# Patient Record
Sex: FEMALE | Race: OTHER | Employment: FULL TIME | URBAN - METROPOLITAN AREA
[De-identification: names, ages, dates, MRNs, and addresses within clinical notes are randomized per-mention and may not be internally consistent; named-entity substitution may affect disease eponyms.]

---

## 2020-10-23 ENCOUNTER — HOSPITAL ENCOUNTER (EMERGENCY)
Age: 25
Discharge: HOME | End: 2020-10-23
Attending: EMERGENCY MEDICINE

## 2020-10-23 VITALS
DIASTOLIC BLOOD PRESSURE: 71 MMHG | RESPIRATION RATE: 16 BRPM | HEIGHT: 60 IN | SYSTOLIC BLOOD PRESSURE: 127 MMHG | BODY MASS INDEX: 25.97 KG/M2 | WEIGHT: 132.28 LBS | OXYGEN SATURATION: 99 % | TEMPERATURE: 98 F | HEART RATE: 79 BPM

## 2020-10-23 DIAGNOSIS — Z20.822 EXPOSURE TO COVID-19 VIRUS: Primary | ICD-10-CM

## 2020-10-23 DIAGNOSIS — Z20.822 PERSON UNDER INVESTIGATION FOR COVID-19: ICD-10-CM

## 2020-10-23 DIAGNOSIS — N39.0 URINARY TRACT INFECTION WITHOUT HEMATURIA, SITE UNSPECIFIED: ICD-10-CM

## 2020-10-23 LAB
APPEARANCE UR: CLEAR
BACTERIA URNS QL MICRO: ABNORMAL /HPF
BILIRUB UR QL: NEGATIVE
COLOR UR: YELLOW
EPITH CASTS URNS QL MICRO: ABNORMAL /LPF (ref 0–5)
GLUCOSE UR STRIP.AUTO-MCNC: NEGATIVE MG/DL
HGB UR QL STRIP: ABNORMAL
KETONES UR QL STRIP.AUTO: NEGATIVE MG/DL
LEUKOCYTE ESTERASE UR QL STRIP.AUTO: ABNORMAL
NITRITE UR QL STRIP.AUTO: NEGATIVE
PH UR STRIP: 6.5 [PH] (ref 5–8)
PROT UR STRIP-MCNC: NEGATIVE MG/DL
RBC #/AREA URNS HPF: ABNORMAL /HPF (ref 0–5)
SP GR UR REFRACTOMETRY: <1.005 (ref 1–1.03)
UROBILINOGEN UR QL STRIP.AUTO: 0.2 EU/DL (ref 0.2–1)
WBC URNS QL MICRO: ABNORMAL /HPF (ref 0–5)

## 2020-10-23 PROCEDURE — 99283 EMERGENCY DEPT VISIT LOW MDM: CPT

## 2020-10-23 PROCEDURE — 81001 URINALYSIS AUTO W/SCOPE: CPT

## 2020-10-23 PROCEDURE — 87635 SARS-COV-2 COVID-19 AMP PRB: CPT

## 2020-10-23 RX ORDER — ONDANSETRON 4 MG/1
4 TABLET, ORALLY DISINTEGRATING ORAL
Qty: 20 TAB | Refills: 0 | Status: SHIPPED | OUTPATIENT
Start: 2020-10-23

## 2020-10-23 RX ORDER — AMOXICILLIN AND CLAVULANATE POTASSIUM 500; 125 MG/1; MG/1
1 TABLET, FILM COATED ORAL 2 TIMES DAILY
Qty: 20 TAB | Refills: 0 | Status: SHIPPED | OUTPATIENT
Start: 2020-10-23

## 2020-10-23 NOTE — ED PROVIDER NOTES
EMERGENCY DEPARTMENT HISTORY AND PHYSICAL EXAM    Date: 10/23/2020  Patient Name: Minh Fisher    History of Presenting Illness     Chief Complaint   Patient presents with    Fever    Generalized Body Aches         History Provided By: Patient and Pashto interprter    Minh Fisher is a 30 y.o. female with no PMHX who presents to the emergency department C/O cough. Associated sxs include congestion body aches subjective fevers. Reports cough congestion body aches subjective fevers acute onset 2 days ago. Patient states that she has had a positive exposure while at work from a coworker. Patient also states that her  has similar symptoms and has not been tested. Pt denies pain, shortness of breath, and any other sxs or complaints. PCP: No primary care provider on file. Past History     Past Medical History:  Past Medical History:   Diagnosis Date    Rhinitis        Past Surgical History:  History reviewed. No pertinent surgical history. Family History:  History reviewed. No pertinent family history. Social History:  Social History     Tobacco Use    Smoking status: Never Smoker    Smokeless tobacco: Never Used   Substance Use Topics    Alcohol use: Not Currently    Drug use: Never       Allergies:  No Known Allergies      Review of Systems   Review of Systems   Constitutional: Positive for chills and fever. HENT: Positive for congestion. Respiratory: Positive for cough. Negative for shortness of breath. Cardiovascular: Negative for chest pain. Musculoskeletal: Positive for myalgias. All other systems reviewed and are negative. Physical Exam     Vitals:    10/23/20 1513   BP: 127/71   Pulse: 79   Resp: 16   Temp: 98 °F (36.7 °C)   SpO2: 99%   Weight: 60 kg (132 lb 4.4 oz)   Height: 5' 0.24\" (1.53 m)     Physical Exam  Vitals signs and nursing note reviewed. Constitutional:       General: She is not in acute distress. Appearance: Normal appearance. She is normal weight. She is not ill-appearing. HENT:      Head: Normocephalic and atraumatic. Nose: Congestion present. Eyes:      Extraocular Movements: Extraocular movements intact. Conjunctiva/sclera: Conjunctivae normal.      Pupils: Pupils are equal, round, and reactive to light. Neck:      Musculoskeletal: Normal range of motion and neck supple. Cardiovascular:      Rate and Rhythm: Normal rate and regular rhythm. Pulmonary:      Effort: Pulmonary effort is normal.      Breath sounds: Normal breath sounds. Musculoskeletal: Normal range of motion. Skin:     General: Skin is warm and dry. Neurological:      General: No focal deficit present. Mental Status: She is alert and oriented to person, place, and time. Psychiatric:         Mood and Affect: Mood normal.         Behavior: Behavior normal.         Diagnostic Study Results     Labs -     Recent Results (from the past 12 hour(s))   URINALYSIS W/ RFLX MICROSCOPIC    Collection Time: 10/23/20  3:35 PM   Result Value Ref Range    Color YELLOW      Appearance CLEAR      Specific gravity <1.005 (L) 1.005 - 1.030    pH (UA) 6.5 5.0 - 8.0      Protein Negative NEG mg/dL    Glucose Negative NEG mg/dL    Ketone Negative NEG mg/dL    Bilirubin Negative NEG      Blood MODERATE (A) NEG      Urobilinogen 0.2 0.2 - 1.0 EU/dL    Nitrites Negative NEG      Leukocyte Esterase MODERATE (A) NEG     URINE MICROSCOPIC ONLY    Collection Time: 10/23/20  3:35 PM   Result Value Ref Range    WBC 5 to 10 0 - 5 /hpf    RBC 0 to 3 0 - 5 /hpf    Epithelial cells 2+ 0 - 5 /lpf    Bacteria FEW (A) NEG /hpf       Radiologic Studies -   No orders to display     CT Results  (Last 48 hours)    None        CXR Results  (Last 48 hours)    None          Medications given in the ED-  Medications - No data to display      Medical Decision Making   I am the first provider for this patient.     I reviewed the vital signs, available nursing notes, past medical history, past surgical history, family history and social history. Vital Signs-Reviewed the patient's vital signs. Pulse Oximetry Analysis - 99% on RA     Records Reviewed: Nursing Notes    Procedures:  Procedures    ED Course:   4:18 PM   Initial assessment performed. The patients presenting problems have been discussed, and they are in agreement with the care plan formulated and outlined with them. I have encouraged them to ask questions as they arise throughout their visit. The COVID19 pandemic has been declared a public health emergency and has led to the need to minimize testing due to significant resource scarcity. I have explained to the patient the importance of home isolation and strict return precautions. Patient has been counseled about the need for self quarantine especially if feeling ill &/or if fever is present. Discussion: 30 y.o. female who is otherwise healthy primarily Omani-speaking with that 2 days of URI type symptoms subjective fevers. Reported positive exposure to Covid while at work requesting screening. Patient is afebrile she has appropriate vital signs with some mild congestion but otherwise normal exam.  Covid swab pending. Urine with a very mild UTI. Plan for Augmentin and PCP follow-up with strict return precautions discussed. Diagnosis and Disposition       DISCHARGE NOTE:  Jose Carlisle Naren's  results have been reviewed with her. She has been counseled regarding her diagnosis, treatment, and plan. She verbally conveys understanding and agreement of the signs, symptoms, diagnosis, treatment and prognosis and additionally agrees to follow up as discussed. She also agrees with the care-plan and conveys that all of her questions have been answered.   I have also provided discharge instructions for her that include: educational information regarding their diagnosis and treatment, and list of reasons why they would want to return to the ED prior to their follow-up appointment, should her condition change. She has been provided with education for proper emergency department utilization. CLINICAL IMPRESSION:    1. Exposure to COVID-19 virus    2. Person under investigation for COVID-19    3. Urinary tract infection without hematuria, site unspecified        PLAN:  1. D/C Home  2. Current Discharge Medication List      START taking these medications    Details   amoxicillin-clavulanate (Augmentin) 500-125 mg per tablet Take 1 Tab by mouth two (2) times a day. Qty: 20 Tab, Refills: 0      ondansetron (Zofran ODT) 4 mg disintegrating tablet Take 1 Tab by mouth every eight (8) hours as needed for Nausea or Vomiting. Qty: 20 Tab, Refills: 0           3. Follow-up Information     Follow up With Specialties Details Why Contact Info    see PCP  Schedule an appointment as soon as possible for a visit      [facility] EMERGENCY DEPT Emergency Medicine  As needed, If symptoms worsen [address] [id number]  [phone]    [address] [place] [address]   for primary care follow up [address] [facility], [address] [place] [address]  [phone]                  Please note that this dictation was completed with [employer], the computer voice recognition software. Quite often unanticipated grammatical, syntax, homophones, and other interpretive errors are inadvertently transcribed by the computer software. Please disregard these errors. Please excuse any errors that have escaped final proofreading.

## 2020-10-23 NOTE — LETTER
NOTIFICATION RETURN TO WORK / SCHOOL 
 
10/23/2020 4:49 PM 
 
Ms. Liz Miller No address on file. To Whom It May Concern: Liz Miller is currently under the care of [facility] EMERGENCY DEPT. She will return to work 11/3/2020 If there are questions or concerns please have the patient contact our office.  
 
 
 
Sincerely, 
 
 
ENRIKE Talavera

## 2020-10-23 NOTE — ED TRIAGE NOTES
Pt w/ c/o generlized body aches and fever of 100.4 starting 2 days ago. Pt reports she had advil today which helped her pain and fever.  Pt denies any urinary pain, back pain, or n/v/d.

## 2020-10-23 NOTE — DISCHARGE INSTRUCTIONS
Patient Education       Patient Education        Yoan Cervantes en las mujeres: Instrucciones de cuidado  Urinary Tract Infection in Women: Care Instructions  Instrucciones de cuidado    Markus infección urinaria (UTI, por melly siglas en inglés) es un término general que hace referencia a markus infección que se produce en cualquier parte entre los riñones y la uretra (conducto por el cual se expulsa la orina). La mayoría de las UTI son infecciones de la vejiga. Con frecuencia, causan dolor o ardor al ClarkKeri. Las UTI son causadas por bacterias y pueden curarse con antibióticos. Asegúrese de completar el tratamiento para que la infección desaparezca. La atención de seguimiento es markus parte clave de giron tratamiento y seguridad. Asegúrese de hacer y acudir a todas las citas, y llame a giron médico si está teniendo problemas. También es markus buena idea saber los resultados de melly exámenes y mantener markus lista de los medicamentos que manuel. ¿Cómo puede cuidarse en el hogar? · [address] No deje de tomarlos por el hecho de sentirse mejor. Debe jennifer todos los antibióticos hasta terminarlos. · Corine los próximos enio o [address], yakelin mayor cantidad de [place] y otros líquidos. [place] puede ayudar a eliminar las bacterias que provocan la infección. (Si tiene markus enfermedad de los riñones, el corazón o el hígado y tiene que Izabela's líquidos, hable con giron médico antes de aumentar giron consumo). · Evite las bebidas gaseosas o con cafeína. Pueden irritar la vejiga. · Orine con frecuencia. Trate de vaciar la vejiga cada vez que orine. · Para aliviar el dolor, tome un baño caliente o colóquese markus almohadilla térmica a baja temperatura sobre la parte baja del abdomen o la svitlana genital. Nunca se duerma mientras [place] markus almohadilla térmica. Para prevenir las infecciones urinarias  · Yakelin abundante agua todos los días.  [place] la ayuda a orinar con frecuencia, lo que elimina las bacterias de giron organismo. (Si tiene markus enfermedad de los riñones, el corazón o el hígado y tiene que [place]'s líquidos, hable con giron médico antes de aumentar giron consumo). · Orine cuando necesite hacerlo. · Orine inmediatamente después de jeanette tenido [employer]. · Cámbiese las toallas sanitarias con frecuencia. · Evite el uso de lavados vaginales, los reginald de burbujas, los [place] de higiene femenina y otros productos para la higiene femenina que contengan desodorantes. · Después de ir al baño, límpiese de adelante hacia atrás. ¿Cuándo debes pedir ayuda? Llama a tu médico ahora mismo o busca atención médica inmediata si:    · Aparecen síntomas jake fiebre, escalofríos, náuseas o vómitos por primera vez, o empeoran.     · Te empieza a doler la espalda, ruby debajo de la caja torácica. A esto se le llama dolor en el flanco.     · Aparece arei o pus en la orina.     · Tienes problemas con los antibióticos. Presta especial atención a los cambios en tu yarely y asegúrate de comunicarte con tu médico si:    · No mejoras después de jeanette tomado un antibiótico luc 2 días.     · Los síntomas desaparecen y Taras Conteh. ¿Dónde puede encontrar más información en inglés? Efrem Mention a http://www.gray.com/  Mendy [id number] en la búsqueda para aprender más acerca de \"Infección urinaria en las mujeres: Instrucciones de cuidado. \"  Revisado: 29 junio, 2020               Versión del contenido: 12.6  © 6773-8207 [employer]wise, Incorporated. Las instrucciones de cuidado fueron adaptadas bajo licencia por [employer] (which disclaims liability or warranty for this information). Si usted tiene [place]ma [place]iver[place] afección médica o sobre estas instrucciones, siempre pregunte a giron profesional de yarely. [facility] niega toda garantía o responsabilidad por giron uso de esta información.

## 2020-10-23 NOTE — LETTER
NOTIFICATION RETURN TO WORK / SCHOOL 
 
10/23/2020 4:49 PM 
 
Ms. Toro Parker No address on file. To Whom It May Concern: Toro Parker is currently under the care of [facility] EMERGENCY DEPT. She will return to work with negative COVID testing If there are questions or concerns please have the patient contact our office.  
 
 
 
Sincerely, 
 
 
ENRIKE Bell

## 2020-10-24 ENCOUNTER — PATIENT OUTREACH (OUTPATIENT)
Dept: CASE MANAGEMENT | Age: 25
End: 2020-10-24

## 2020-10-24 LAB
SARS-COV-2, COV2NT: NOT DETECTED
SOURCE, COVRS: NORMAL
SPECIMEN TYPE, XMCV1T: NORMAL

## 2020-10-24 NOTE — PROGRESS NOTES
Date/Time:  10/24/2020 10:24 AM   Call within 2 business days of discharge: Yes   Attempted to reach patient by telephone. Left HIPPA compliant message requesting a return call. Will attempt to reach patient again. Covid test pending.

## 2023-05-15 RX ORDER — ONDANSETRON 4 MG/1
4 TABLET, ORALLY DISINTEGRATING ORAL EVERY 8 HOURS PRN
COMMUNITY
Start: 2020-10-23

## 2023-05-15 RX ORDER — AMOXICILLIN AND CLAVULANATE POTASSIUM 500; 125 MG/1; MG/1
1 TABLET, FILM COATED ORAL 2 TIMES DAILY
COMMUNITY
Start: 2020-10-23